# Patient Record
Sex: FEMALE | URBAN - METROPOLITAN AREA
[De-identification: names, ages, dates, MRNs, and addresses within clinical notes are randomized per-mention and may not be internally consistent; named-entity substitution may affect disease eponyms.]

---

## 2019-08-27 ENCOUNTER — OFFICE VISIT (OUTPATIENT)
Dept: URBAN - METROPOLITAN AREA CLINIC 62 | Facility: CLINIC | Age: 59
End: 2019-08-27
Payer: COMMERCIAL

## 2019-08-27 DIAGNOSIS — H52.13 MYOPIA, BILATERAL: Primary | Chronic | ICD-10-CM

## 2019-08-27 DIAGNOSIS — H43.812 VITREOUS DEGENERATION, LEFT EYE: Chronic | ICD-10-CM

## 2019-08-27 DIAGNOSIS — H25.13 AGE-RELATED NUCLEAR CATARACT, BILATERAL: Chronic | ICD-10-CM

## 2019-08-27 PROCEDURE — 92002 INTRM OPH EXAM NEW PATIENT: CPT | Performed by: OPTOMETRIST

## 2019-08-27 PROCEDURE — 92310 CONTACT LENS FITTING OU: CPT | Performed by: OPTOMETRIST

## 2019-08-27 ASSESSMENT — KERATOMETRY
OS: 44.25
OD: 44.13

## 2019-08-27 ASSESSMENT — INTRAOCULAR PRESSURE
OD: 12
OS: 13

## 2019-08-27 ASSESSMENT — VISUAL ACUITY
OD: 20/20
OS: 20/20

## 2019-08-27 NOTE — IMPRESSION/PLAN
Impression: Myopia, bilateral: H52.13. Plan: Dispensed Rx for glasses to patient and instructed on normal adaptation period. Dispense contact lens trials and 1 week f/u.

## 2019-09-03 ENCOUNTER — OFFICE VISIT (OUTPATIENT)
Dept: URBAN - METROPOLITAN AREA CLINIC 62 | Facility: CLINIC | Age: 59
End: 2019-09-03

## 2019-09-03 PROCEDURE — 92310 CONTACT LENS FITTING OU: CPT | Performed by: OPTOMETRIST

## 2019-09-03 NOTE — IMPRESSION/PLAN
Impression: Myopia, bilateral: H52.13. Plan: Dispensed contact lens Rx, order trial and ok to order.